# Patient Record
(demographics unavailable — no encounter records)

---

## 2024-10-18 NOTE — HISTORY OF PRESENT ILLNESS
[Good] : Good [Percent Adherence: _____ %] : [unfilled]% adherence [No] : No [Yes, specify: ______________] : Yes, specify: [unfilled] [FreeTextEntry1] : MICAH CEBALLOS is a 29 year old, male seen on 10/18/2024 for  HIV follow up.   Adherence: In the past 6 months now missing Triumeq only once a month. Taking at 2pm.  Ran out of meds a few days ago.  HTN meds taking at the same time.  Taking Vitamin D daily and Aspirin.   Housing: One bedroom in the [place], [facility] housing.  Occupation: Applying to a few jobs, getting interviews but not getting called back.  Still highly motivated to find work.   Vascular: Last seen 7/2022, counseled about the importance of having an evaluation, pts medicaid is now active.  chronic/ intermittent R leg ulcer. states hes been keeping the skin moisturized, today has some minor cracking of skin on inner LE, pt claims he is cleaning the area    Diet/ Exercise: Cooks at home here and there.  Still eating a lot of fast food/ processed foods but trying to make healthier choices.  No more walking since his headphones broke, will not walk unless he has music.  Reports he has been taking his metformin again.   Optho: last 2019, optometry last 2023, given new glasses   Sexual Hx: Single, interested in cis females. Denies any sexual activity.   Denies alcohol/ tobacco marijuana: socially when hanging out with friends. usually a few times weekly.   Dental: 2018, needs to schedule. Flu Vaccine:  will do today  Covid Vaccine: 9/22/21 and 10/13/21 Pfizer vaccine

## 2024-10-18 NOTE — DISCUSSION/SUMMARY
[VL Stable, no change needed] : VL Stable, no change needed [15 min] : 15 min [HIV Education] : HIV Education [Transmission] : transmission [ARV Therapy] : ARV therapy [Universal Precautions] : universal precautions [Treatment Education] : treatment education [Drug Interactions] : drug interactions [Immune System] : immune system [Treatment Adherence] : treatment adherence [Anticipatory Guidance] : anticipatory guidance [Prognosis] : prognosis [Pre-conception Counseling] : pre-conception counseling [HIV info] : HIV info [Condoms] : condoms [Risk Reduction] : risk reduction [PrEP/PEP] : PrEP/PEP [The Topics Listed Above] : the topics listed above [The patient was able to ask questions and explain these concepts in his/her own words] : the patient was able to ask questions and explain these concepts in his/her own words [Nutrition/Food Issues] : nutrition/food issues [Frequent F/U] : frequent f/u [Vaccine Info Sheets] : vaccine info sheets

## 2024-10-18 NOTE — PHYSICAL EXAM
[Alert] : alert [Well Nourished] : well nourished [Healthy Appearance] : healthy appearance [No Acute Distress] : no acute distress [Well Developed] : well developed [Normal Pupil & Iris Size/Symmetry] : normal pupil and iris size and symmetry [Sclera Not Icteric] : sclera not icteric [Normal Outer Ear/Nose] : the ears and nose were normal in appearance [No Neck Mass] : no neck mass was observed [Normal Rate and Effort] : normal respiratory rhythm and effort [Bilateral Audible Breath Sounds] : bilateral audible breath sounds [Normal Rate] : heart rate was normal  [Regular Rhythm] : with a regular rhythm [Normal Cervical Lymph Nodes] : cervical [Normal Mood] : mood was normal [Normal Affect] : affect was normal [Alert, Awake, Oriented as Age-Appropriate] : alert, awake, oriented as age appropriate [de-identified] : b/l lower extremity edema and varicose veins. R LE with cracking and bleeding on inner LE. no pus, ooozing, warmth or erythema.

## 2025-01-17 NOTE — HISTORY OF PRESENT ILLNESS
[Good] : Good [Percent Adherence: _____ %] : [unfilled]% adherence [No] : No [Yes, specify: ______________] : Yes, specify: [unfilled] [FreeTextEntry1] : MICAH CEBALLOS is a 29 year old, male seen on 01/17/2025 for  HIV Annual Exam.    Adherence: In the past 3 months now missing Triumeq only once a month. Taking at 2pm.   HTN meds taking at the same time.  Taking Vitamin D daily and Aspirin.   Housing: [facility] in the [place], [place]  Occupation: Continuing to apply to work and go on interviews.   Vascular: Last seen 7/2022, counseled about the importance of having an evaluation, confirmed that patient has # to their office.  chronic/ intermittent R leg ulcer. reports less leaking/ discharge. Using vaseline daily.   Diet/ Exercise: Cooks at home here and there. Has fast food twice a week/ processed foods but trying to make healthier choices.  At the shelter they get food on Thursdays and on Saturdays goes to food bank at [employer].  Has to go up and down 4 flights to his place   Reports he has been taking his metformin again.   Optho: last 2019, optometry last 2023, given new glasses   Sexual Hx: Single, interested in cis females. Denies any sexual activity.   Denies alcohol/ tobacco marijuana: socially when hanging out with friends. usually a few times weekly.   Dental: 2018, needs to schedule. Flu Vaccine:  10/18/24 Covid Vaccine: 9/22/21 and 10/13/21 Pfizer vaccine

## 2025-01-17 NOTE — DISCUSSION/SUMMARY
[VL Stable, no change needed] : VL Stable, no change needed [15 min] : 15 min [HIV Education] : HIV Education [Transmission] : transmission [ARV Therapy] : ARV therapy [Universal Precautions] : universal precautions [Treatment Education] : treatment education [Drug Interactions] : drug interactions [Immune System] : immune system [Treatment Adherence] : treatment adherence [Anticipatory Guidance] : anticipatory guidance [Prognosis] : prognosis [Pre-conception Counseling] : pre-conception counseling [Nutrition/Food Issues] : nutrition/food issues [Frequent F/U] : frequent f/u [HIV info] : HIV info [Condoms] : condoms [Risk Reduction] : risk reduction [Vaccine Info Sheets] : vaccine info sheets [PrEP/PEP] : PrEP/PEP [The Topics Listed Above] : the topics listed above [The patient was able to ask questions and explain these concepts in his/her own words] : the patient was able to ask questions and explain these concepts in his/her own words

## 2025-01-17 NOTE — PHYSICAL EXAM
[Alert] : alert [Well Nourished] : well nourished [Healthy Appearance] : healthy appearance [No Acute Distress] : no acute distress [Well Developed] : well developed [Normal Pupil & Iris Size/Symmetry] : normal pupil and iris size and symmetry [Sclera Not Icteric] : sclera not icteric [Normal Outer Ear/Nose] : the ears and nose were normal in appearance [No Neck Mass] : no neck mass was observed [Normal Rate and Effort] : normal respiratory rhythm and effort [Bilateral Audible Breath Sounds] : bilateral audible breath sounds [Normal Rate] : heart rate was normal  [Regular Rhythm] : with a regular rhythm [Normal Cervical Lymph Nodes] : cervical [Normal Mood] : mood was normal [Normal Affect] : affect was normal [Alert, Awake, Oriented as Age-Appropriate] : alert, awake, oriented as age appropriate [de-identified] : b/l lower extremity edema and varicose veins. R LE with cracking and bleeding on inner LE. no pus, ooozing, warmth or erythema.

## 2025-03-20 NOTE — HISTORY OF PRESENT ILLNESS
[FreeTextEntry1] : 29 yo male presents to the office to evaluate RLE calf wound. 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 Wound appeared in Nov/Dec 2024. He did not seek medical attention until today. Has not been compliant with compression stockings because they were old and ripped and didn't replace them. Denies leg pain. He has extensive varicosities including chest, abdomen, groin and bilateral lower extremities. Denies claudication or rest pain.

## 2025-03-20 NOTE — PHYSICAL EXAM
[2+] : left 2+ [Ankle Swelling (On Exam)] : present [Varicose Veins Of Lower Extremities] : bilaterally [Ankle Swelling Bilaterally] : severe [] : of the right leg [Ankle Swelling On The Left] : moderate [Skin Ulcer] : ulcer [Alert] : alert [Oriented to Person] : oriented to person [Oriented to Place] : oriented to place [Oriented to Time] : oriented to time [Calm] : calm [de-identified] : NAD [de-identified] : NCAT [de-identified] : unlabored breathing [FreeTextEntry1] : right medial calf wound 5 cm length x 2 cm width x 1 mm depth no drainage moderate bilateral leg edema extensive varicosities noted in chest, abdomen, groin and bilateral legs [de-identified] : RLE medial calf wound 5 cm length x 2 cm width x 1 mm depth

## 2025-03-20 NOTE — ASSESSMENT
[FreeTextEntry1] : Impression - IVC atresia, venous insufficiency, new recurrent RLE wound   Plan Conservative medical management with leg elevation and left knee high 20-30 mm hg compression stockings Will need home care for RLE unna boot once a week Home care referral Unna alexa supplies provided Transition to bilateral knee high 20-30 mm hg compression stockings once RLE wound heals Return to office in 1 month April 2025 to evaluate RLE wound  [Arterial/Venous Disease] : arterial/venous disease [Ulcer Care] : ulcer care

## 2025-07-18 NOTE — HISTORY OF PRESENT ILLNESS
[Percent Adherence: _____ %] : [unfilled]% adherence [No] : No [Yes, specify: ______________] : Yes, specify: [unfilled] [Good] : Good [FreeTextEntry1] : MICAH CEBALLOS is a 30 year old, male seen on 07/18/2025 for  HIV follow up.    Adherence: In the past 6 months  missing his ARV about once a month. Taking at 2pm.   HTN meds taking at the same time.  Taking Vitamin D daily   Housing: [facility] in [place]  Occupation: Continuing to apply to work and go on interviews. [employer] in August   Vascular: Last seen 3/25  chronic/ intermittent R leg ulcer. reports less leaking/ discharge. Using Vaseline daily.  Had home wound care for 2 visits before he changed his phone number. not wearing compression socks.   Diet/ Exercise: Cooks at home here and there. Has fast food twice a week/ processed foods but trying to make healthier choices.    Has to go up and down 4 flights to his place   Reports he has been taking his metformin again.   Optho: last 2019, optometry last 2025, given new glasses   Sexual Hx: Single, interested in cis females. Denies any sexual activity.   Denies alcohol/ tobacco marijuana: socially when hanging out with friends. usually a few times weekly.   Dental: 2018, needs to schedule. Flu Vaccine:  10/18/24 Covid Vaccine: 9/22/21 and 10/13/21 Pfizer vaccine

## 2025-07-18 NOTE — PHYSICAL EXAM
[Alert] : alert [Well Nourished] : well nourished [Healthy Appearance] : healthy appearance [No Acute Distress] : no acute distress [Well Developed] : well developed [Normal Pupil & Iris Size/Symmetry] : normal pupil and iris size and symmetry [Sclera Not Icteric] : sclera not icteric [Normal Outer Ear/Nose] : the ears and nose were normal in appearance [No Neck Mass] : no neck mass was observed [Normal Rate and Effort] : normal respiratory rhythm and effort [Bilateral Audible Breath Sounds] : bilateral audible breath sounds [Normal Rate] : heart rate was normal  [Regular Rhythm] : with a regular rhythm [Normal Cervical Lymph Nodes] : cervical [Normal Mood] : mood was normal [Normal Affect] : affect was normal [Alert, Awake, Oriented as Age-Appropriate] : alert, awake, oriented as age appropriate [de-identified] : b/l lower extremity edema and varicose veins. R LE with cracking and bleeding on inner LE. no pus, ooozing, warmth or erythema.